# Patient Record
Sex: FEMALE | Race: BLACK OR AFRICAN AMERICAN | NOT HISPANIC OR LATINO | Employment: FULL TIME | URBAN - METROPOLITAN AREA
[De-identification: names, ages, dates, MRNs, and addresses within clinical notes are randomized per-mention and may not be internally consistent; named-entity substitution may affect disease eponyms.]

---

## 2024-03-11 DIAGNOSIS — D50.9 IRON DEFICIENCY ANEMIA, UNSPECIFIED IRON DEFICIENCY ANEMIA TYPE: Primary | ICD-10-CM

## 2024-03-26 ENCOUNTER — APPOINTMENT (OUTPATIENT)
Dept: PRIMARY CARE | Facility: CLINIC | Age: 34
End: 2024-03-26
Payer: COMMERCIAL

## 2024-05-07 ENCOUNTER — OFFICE VISIT (OUTPATIENT)
Dept: ORTHOPEDIC SURGERY | Facility: HOSPITAL | Age: 34
End: 2024-05-07
Payer: COMMERCIAL

## 2024-05-07 ENCOUNTER — HOSPITAL ENCOUNTER (OUTPATIENT)
Dept: RADIOLOGY | Facility: HOSPITAL | Age: 34
Discharge: HOME | End: 2024-05-07
Payer: COMMERCIAL

## 2024-05-07 DIAGNOSIS — S46.212A BICEPS RUPTURE, DISTAL, LEFT, INITIAL ENCOUNTER: ICD-10-CM

## 2024-05-07 DIAGNOSIS — M25.512 LEFT SHOULDER PAIN, UNSPECIFIED CHRONICITY: ICD-10-CM

## 2024-05-07 DIAGNOSIS — M79.602 ARM PAIN, ANTERIOR, LEFT: Primary | ICD-10-CM

## 2024-05-07 DIAGNOSIS — M79.602 ARM PAIN, ANTERIOR, LEFT: ICD-10-CM

## 2024-05-07 PROCEDURE — 73030 X-RAY EXAM OF SHOULDER: CPT | Mod: LT

## 2024-05-07 PROCEDURE — 99204 OFFICE O/P NEW MOD 45 MIN: CPT | Performed by: FAMILY MEDICINE

## 2024-05-07 PROCEDURE — 73080 X-RAY EXAM OF ELBOW: CPT | Mod: LT

## 2024-05-07 PROCEDURE — 73080 X-RAY EXAM OF ELBOW: CPT | Mod: LEFT SIDE | Performed by: RADIOLOGY

## 2024-05-07 PROCEDURE — 99214 OFFICE O/P EST MOD 30 MIN: CPT | Performed by: FAMILY MEDICINE

## 2024-05-07 PROCEDURE — 73030 X-RAY EXAM OF SHOULDER: CPT | Mod: LEFT SIDE | Performed by: RADIOLOGY

## 2024-05-07 NOTE — PROGRESS NOTES
Sports Medicine Office Note    Today's Date:  05/07/2024     HPI: Atiya Jennings is a 33 y.o. female former  who presents today for evaluation of left arm pain.  She states on 4/29/2024, she was moving a sectional couch at home, when her 2 kids (2-year-old and 7-year-old) jumped onto the section she was moving, yanking her left arm downward and stuck into section of the couch.  When she pulled her left arm out, she states she felt sharp pain near her elbow and associated popping sensation.  She had immediate pain in her elbow radiating proximally up towards anterior aspect of her left shoulder.  She was seen at urgent care on 5/2/2024, was given a sling and prescribed ibuprofen 600 mg, with recommended follow-up with orthopedics.  Since then she has been wearing her sling regularly and taking ibuprofen 600 mg twice daily in addition to Tylenol 500 mg twice daily with minimal relief.  States she has not had significant improvement of pain.  She has had subsequent diffuse bruising across left upper arm with associated pain radiating along proximal/anterior left shoulder down towards proximal forearm.  She has had pain with left arm movement, and associated weakness.  Also states she has had intermittent numbness/tingling into her left hand, mostly digits 3-5.  Denies any previous injury to left arm or history of surgeries.    She has no other complaints.    Physical Examination:     The Left shoulder is without obvious signs of acute bony deformity or erythema.  There is ecchymosis over anterior shoulder near proximal attachment of biceps tendon with associated tenderness to palpation.  There is mild tenderness along the joint line. There is no tenderness at the AC joint. There is no tenderness at the SC joint. Active range of motion is limited and painful. Passive range of motion is full, painful and symmetrical. Pain with crossover.  Apprehension test deferred due to pain.  Negative sulcus sign.  Speeds  and Yergason's test positive with pain and weakness as compared to opposite shoulder.  Rotator cuff strength is slightly limited and minimally painful. The neck and opposite shoulder are otherwise normal and stable.     The Left elbow is without obvious signs of acute bony deformity or erythema.  No significant Willian deformity, though muscle tone low in bilateral upper extremities.  Negative hook test at distal biceps tendon.  There is diffuse ecchymosis along anterior aspect of upper arm, medial and lateral aspect of elbow, and posterior aspect of elbow with associated tenderness to palpation.  There is tenderness to the medial joint line. There is tenderness to the lateral joint line. The posterior elbow is tender just proximal to olecranon. Active range of motion is limited, symmetrical and painful. Passive range of motion is full and symmetrical. There is no instability with stress testing. Forearm range of motion is full, painful and symmetrical. Strength is reduced in elbow flexion/extension, supination, pronation and painful.  Pain with resisted EDC and wrist extension. Wrist is full range of motion with mild pain at elbow. Sensory and vascular exams are otherwise normal.  Positive Tinel's at cubital tunnel.  The opposite arm is normal.  Normal  strength with mild pain.  Gait is pain-free and tandem.     Imaging:  Radiographs of the left shoulder and elbow were reviewed and revealed no acute osseous abnormalities.  The studies were reviewed by me personally in the office today.    Problem List Items Addressed This Visit    None  Visit Diagnoses         Codes    Arm pain, anterior, left    -  Primary M79.602    Relevant Orders    XR elbow left 3+ views    MR elbow left wo IV contrast    Left shoulder pain, unspecified chronicity     M25.512    Relevant Orders    XR shoulder left 2+ views    Biceps rupture, distal, left, initial encounter     S46.212A    Relevant Orders    MR elbow left wo IV contrast           Assessment and Plan:     We reviewed the exam and x-ray findings and discussed the conservative and surgical treatment options. We agreed to continue sling for comfort and encouraged out of sling elbow range of motion as tolerated.  Recommended prescription doses of Tylenol, and may continue ibuprofen 600 mg up to 3 times daily as needed for pain/swelling.  Recommended ice as needed for pain.  STAT MRI left elbow ordered today due to concern for left biceps rupture and will follow-up when resulted.  She may follow-up sooner if any new concerns arise.    **This note was dictated using Dragon speech recognition software and was not corrected for spelling or grammatical errors**.    Nayan Hadley MD  Sports Medicine Specialist  [facility] [place]

## 2024-05-08 ENCOUNTER — HOSPITAL ENCOUNTER (OUTPATIENT)
Dept: RADIOLOGY | Facility: CLINIC | Age: 34
Discharge: HOME | End: 2024-05-08
Payer: COMMERCIAL

## 2024-05-08 DIAGNOSIS — S46.212A BICEPS RUPTURE, DISTAL, LEFT, INITIAL ENCOUNTER: ICD-10-CM

## 2024-05-08 DIAGNOSIS — M79.602 ARM PAIN, ANTERIOR, LEFT: ICD-10-CM

## 2024-05-08 PROCEDURE — 73221 MRI JOINT UPR EXTREM W/O DYE: CPT | Mod: LT

## 2024-05-08 PROCEDURE — 73221 MRI JOINT UPR EXTREM W/O DYE: CPT | Mod: LEFT SIDE | Performed by: RADIOLOGY

## 2024-07-19 DIAGNOSIS — F17.200 SMOKING: Primary | ICD-10-CM

## 2024-07-19 RX ORDER — IBUPROFEN 200 MG
1 TABLET ORAL EVERY 24 HOURS
Qty: 30 PATCH | Refills: 0 | Status: SHIPPED | OUTPATIENT
Start: 2024-07-19 | End: 2024-08-18

## 2024-08-15 ENCOUNTER — TELEPHONE (OUTPATIENT)
Dept: PRIMARY CARE | Facility: CLINIC | Age: 34
End: 2024-08-15
Payer: COMMERCIAL

## 2025-05-14 ENCOUNTER — APPOINTMENT (OUTPATIENT)
Dept: RHEUMATOLOGY | Facility: CLINIC | Age: 35
End: 2025-05-14
Payer: COMMERCIAL

## 2025-05-20 ENCOUNTER — APPOINTMENT (OUTPATIENT)
Dept: PRIMARY CARE | Facility: CLINIC | Age: 35
End: 2025-05-20
Payer: COMMERCIAL

## 2025-07-30 ENCOUNTER — APPOINTMENT (OUTPATIENT)
Dept: RHEUMATOLOGY | Facility: CLINIC | Age: 35
End: 2025-07-30
Payer: COMMERCIAL